# Patient Record
Sex: MALE | Race: WHITE | NOT HISPANIC OR LATINO | Employment: OTHER | URBAN - NONMETROPOLITAN AREA
[De-identification: names, ages, dates, MRNs, and addresses within clinical notes are randomized per-mention and may not be internally consistent; named-entity substitution may affect disease eponyms.]

---

## 2023-08-09 ENCOUNTER — OFFICE VISIT (OUTPATIENT)
Dept: CARDIOLOGY | Facility: CLINIC | Age: 58
End: 2023-08-09
Payer: MEDICARE

## 2023-08-09 VITALS
OXYGEN SATURATION: 99 % | WEIGHT: 203 LBS | SYSTOLIC BLOOD PRESSURE: 134 MMHG | HEIGHT: 72 IN | DIASTOLIC BLOOD PRESSURE: 72 MMHG | BODY MASS INDEX: 27.5 KG/M2 | HEART RATE: 53 BPM

## 2023-08-09 DIAGNOSIS — I10 PRIMARY HYPERTENSION: ICD-10-CM

## 2023-08-09 DIAGNOSIS — G47.33 OSA (OBSTRUCTIVE SLEEP APNEA): Primary | ICD-10-CM

## 2023-08-09 DIAGNOSIS — G47.10 HYPERSOMNIA, UNSPECIFIED: ICD-10-CM

## 2023-08-09 DIAGNOSIS — G47.00 INSOMNIA, UNSPECIFIED TYPE: ICD-10-CM

## 2023-08-09 RX ORDER — CARVEDILOL 6.25 MG/1
1 TABLET ORAL 2 TIMES DAILY
COMMUNITY

## 2023-08-09 RX ORDER — PHENOL 1.4 %
1 AEROSOL, SPRAY (ML) MUCOUS MEMBRANE NIGHTLY
COMMUNITY

## 2023-08-09 RX ORDER — ATORVASTATIN CALCIUM 40 MG/1
40 TABLET, FILM COATED ORAL DAILY
COMMUNITY
Start: 2023-04-26

## 2023-08-09 RX ORDER — NIFEDIPINE 90 MG/1
1 TABLET, FILM COATED, EXTENDED RELEASE ORAL DAILY
COMMUNITY
Start: 2023-05-22

## 2023-08-09 NOTE — PATIENT INSTRUCTIONS
Quality Sleep Information, Adult  Quality sleep is important for your mental and physical health. It also improves your quality of life. Quality sleep means you:  Are asleep for most of the time you are in bed.  Fall asleep within 30 minutes.  Wake up no more than once a night.   Are awake for no longer than 20 minutes if you do wake up during the night.  Most adults need 7-8 hours of quality sleep each night.  How can poor sleep affect me?  If you do not get enough quality sleep, you may have:  Mood swings.  Daytime sleepiness.  Confusion.  Decreased reaction time.  Sleep disorders, such as insomnia and sleep apnea.  Difficulty with:  Solving problems.  Coping with stress.  Paying attention.  These issues may affect your performance and productivity at work, school, and at home. Lack of sleep may also put you at higher risk for accidents, suicide, and risky behaviors.  If you do not get quality sleep you may also be at higher risk for several health problems, including:  Infections.  Type 2 diabetes.  Heart disease.  High blood pressure.  Obesity.  Worsening of long-term conditions, like arthritis, kidney disease, depression, Parkinson's disease, and epilepsy.  What actions can I take to get more quality sleep?  A sign showing that a person should not smoke.         A sign showing that a person should not drink alcohol.      Stick to a sleep schedule. Go to sleep and wake up at about the same time each day. Do not try to sleep less on weekdays and make up for lost sleep on weekends. This does not work.  Try to get about 30 minutes of exercise on most days. Do not exercise 2-3 hours before going to bed.  Limit naps during the day to 30 minutes or less.  Do not use any products that contain nicotine or tobacco, such as cigarettes or e-cigarettes. If you need help quitting, ask your health care provider.  Do not drink caffeinated beverages for at least 8 hours before going to bed. Coffee, tea, and some sodas contain  caffeine.  Do not drink alcohol close to bedtime.  Do not eat large meals close to bedtime.  Do not take naps in the late afternoon.  Try to get at least 30 minutes of sunlight every day. Morning sunlight is best.  Make time to relax before bed. Reading, listening to music, or taking a hot bath promotes quality sleep.  Make your bedroom a place that promotes quality sleep. Keep your bedroom dark, quiet, and at a comfortable room temperature. Make sure your bed is comfortable. Take out sleep distractions like TV, a computer, smartphone, and bright lights.  If you are lying awake in bed for longer than 20 minutes, get up and do a relaxing activity until you feel sleepy.  Work with your health care provider to treat medical conditions that may affect sleeping, such as:  Nasal obstruction.  Snoring.  Sleep apnea and other sleep disorders.  Talk to your health care provider if you think any of your prescription medicines may cause you to have difficulty falling or staying asleep.  If you have sleep problems, talk with a sleep consultant. If you think you have a sleep disorder, talk with your health care provider about getting evaluated by a specialist.  Where to find more information  National Sleep Foundation website: https://sleepfoundation.org  National Heart, Lung, and [place] (NHLBI): www.nhlbi.nih.gov/files/docs/public/sleep/healthy_sleep.pdf  Centers for Disease Control and Prevention (CDC): www.cdc.gov/sleep/index.html  Contact a health care provider if you:  Have trouble getting to sleep or staying asleep.  Often wake up very early in the morning and cannot get back to sleep.  Have daytime sleepiness.  Have daytime sleep attacks of suddenly falling asleep and sudden muscle weakness (narcolepsy).  Have a tingling sensation in your legs with a strong urge to move your legs (restless legs syndrome).  Stop breathing briefly during sleep (sleep apnea).  Think you have a sleep disorder or are taking a medicine  that is affecting your quality of sleep.  Summary  Most adults need 7-8 hours of quality sleep each night.  Getting enough quality sleep is an important part of health and well-being.  Make your bedroom a place that promotes quality sleep and avoid things that may cause you to have poor sleep, such as alcohol, caffeine, smoking, and large meals.  Talk to your health care provider if you have trouble falling asleep or staying asleep.  This information is not intended to replace advice given to you by your health care provider. Make sure you discuss any questions you have with your health care provider.  Document Revised: 10/17/2022 Document Reviewed: 10/17/2022  Elsevier Patient Education c 2022 [employer]

## 2023-08-09 NOTE — ASSESSMENT & PLAN NOTE
Known history of sleep apnea.  He reports he had his original home sleep study back around 2017.  He reports he has not been using APAP device for more than 2 years.    He has excessive daytime sleepiness, snoring at night and coexisting conditions hypertension and coronary artery disease that would benefit from sleep apnea treatment.    He has lost about 30 pounds over the last 5 years and he is exercising daily so he has made improvements that may have benefited him.    Plan a home sleep test for reevaluation of sleep apnea.  We discussed that we will call him with his Pap study results and if positive he is agreeable to restart PAP therapy.

## 2023-08-09 NOTE — PROGRESS NOTES
New Sleep Consult     Date:   2023  Name: Milton Good  :   1965  PCP: Silvio Alva MD    Chief Complaint   Patient presents with    [facility]     Sleep evaluation  Neck size - 15.5       Subjective     History of Present Illness  Milton Good is a 58 y.o. male who presents today for new patient referral from Dr. Alva with a known history of sleep apnea from 2017. He had a home study years ago and he does not recall who the doctor was. He has not used cpap therapy for several years due to not comfortable with the mask. He has seen ENT in [place] for nasal polyps and GHULAM but again, he could not find a mask that he could tolerate and fall asleep. He comes in today for new patient evaluation. He reports that he is willing to retry if this might help him.     He has been taking Melatonin but not sure it is helping. He cannot fall asleep, cannot stay asleep, moves legs in sleep and not rested in the mornings.     He has coexisting CAD, HTN.     No specialty comments available.    Further details are as follows:    Neck Measurement: 15.5 inches    [place] Scale is (out of 24): Total score: 10     Estimated average amount of sleep per night: 4  Number of times he wakes up at night: 4  Difficulty falling back asleep: yes  It usually takes 60 minutes to go to sleep.  He feels sleepy upon waking up: yes  Rotating or night shift work: no    Drowsiness/Sleepiness:  He exhibits the following:  excessive daytime sleepiness  excessive daytime fatigue  falls asleep watching TV  falls asleep during times of the day when he is quiet    Snoring/Breathing:  He exhibits the following:  loud snoring, awakens with dry mouth, sore throat when waking up in the morning, trouble breathing through nose at night, and trouble breathing through nose during the day    Head Injury:  He exhibits the following:  No    Reflux:  He describes the following:  eats 1 meals daily   exercises  "daily    Narcolepsy:  He exhibits the following:  sudden episodes of sleep during the day    RLS/PLMs:  He describes the following:  moves or jerks during sleep  Shoulder and wrist pain that interrupts sleep     Insomnia:  He describes the following:  problems initiating sleep at night  frequent awakenings  bothered by pain at night  restless sleep    Parasomnia:  He exhibits the following:  sleep talks    Weight:       08/09/23  1002   Weight: 92.1 kg (203 lb)      Weight change in the last year:  loss: 15 lbs over 5 years he has lost 35#     The patient's relevant past medical, surgical, family, and social history reviewed and updated in Epic as appropriate.    Past Medical History:   Diagnosis Date    Hyperlipidemia     Hypertension      Past Surgical History:   Procedure Laterality Date    REPLACEMENT TOTAL KNEE Right 2017       No Known Allergies  Prior to Admission medications    Medication Sig Start Date End Date Taking? Authorizing Provider   atorvastatin (LIPITOR) 40 MG tablet Take 1 tablet by mouth Daily. 4/26/23  Yes Nikole Gallardo MD   carvedilol (COREG) 6.25 MG tablet Take 1 tablet by mouth 2 (Two) Times a Day.   Yes Nikole Gallardo MD   Melatonin 10 MG tablet Take 1 tablet by mouth Every Night.   Yes Nikole Gallardo MD   NIFEdipine CC (ADALAT CC) 90 MG 24 hr tablet Take 1 tablet by mouth Daily. 5/22/23  Yes Nikole Gallardo MD   Pediatric Multivit-Minerals (COMPLETE MULTI-VITAMIN PO) Take  by mouth.   Yes Nikole Gallardo MD     Family History   Problem Relation Age of Onset    Hypertension Mother     Stroke Mother        Objective     Vital Signs:  /72 (BP Location: Right arm, Patient Position: Sitting)   Pulse 53   Ht 182.9 cm (72\")   Wt 92.1 kg (203 lb)   SpO2 99%   BMI 27.53 kg/mý     BMI is >= 25 and <30. (Overweight) The following options were offered after discussion;: referral to primary care        Physical Exam  Constitutional:       Appearance: " Normal appearance. He is well-developed.   HENT:      Head: Normocephalic and atraumatic.      Nose: Nose normal.      Mouth/Throat:      Mouth: Mucous membranes are moist.   Eyes:      General: No scleral icterus.     Pupils: Pupils are equal, round, and reactive to light.   Neck:      Vascular: No carotid bruit.   Cardiovascular:      Rate and Rhythm: Normal rate and regular rhythm.      Pulses: Normal pulses.           Radial pulses are 2+ on the right side and 2+ on the left side.        Dorsalis pedis pulses are 2+ on the right side and 2+ on the left side.        Posterior tibial pulses are 2+ on the right side and 2+ on the left side.      Heart sounds: Normal heart sounds. No murmur heard.  Pulmonary:      Effort: Pulmonary effort is normal.      Breath sounds: Normal breath sounds. No wheezing or rhonchi.   Abdominal:      General: Bowel sounds are normal.   Musculoskeletal:      Right lower leg: No edema.      Left lower leg: No edema.   Skin:     General: Skin is warm and dry.      Capillary Refill: Capillary refill takes less than 2 seconds.      Coloration: Skin is not cyanotic.      Nails: There is no clubbing.   Neurological:      Mental Status: He is alert and oriented to person, place, and time.      Motor: No weakness.      Gait: Gait normal.   Psychiatric:         Mood and Affect: Mood normal.         Behavior: Behavior normal. Behavior is cooperative.         Thought Content: Thought content normal.         Cognition and Memory: Memory normal.           Labs reviewed: 6/30/2023 CBC, CMP, TSH, iron, TIBC and office note from family physician Dr. Alva 6/30/2023.          Assessment and Plan     Milton Good is a 58 y.o. male who presents for further evaluation of excessive daytime sleepiness and fatigue, nonrestorative sleep, and concerns for sleep disordered breathing and obstructive sleep apnea.  We will obtain testing for further evaluation.  The patient will return for follow-up and  recommendations after test.  I have discussed weight loss as it pertains to obstructive sleep apnea.    Diagnoses and all orders for this visit:    1. GHULAM (obstructive sleep apnea) (Primary)  Assessment & Plan:  Known history of sleep apnea.  He reports he had his original home sleep study back around 2017.  He reports he has not been using APAP device for more than 2 years.    He has excessive daytime sleepiness, snoring at night and coexisting conditions hypertension and coronary artery disease that would benefit from sleep apnea treatment.    He has lost about 30 pounds over the last 5 years and he is exercising daily so he has made improvements that may have benefited him.    Plan a home sleep test for reevaluation of sleep apnea.  We discussed that we will call him with his Pap study results and if positive he is agreeable to restart PAP therapy.    Orders:  -     Home Sleep Study; Future    2. Hypersomnia, unspecified  Assessment & Plan:  He reports he has excessive daytime sleepiness.  He reports that he is sleepy when he is in active such as sitting quietly or watching television or as a passenger in a car.    He denies any sleepiness when he is performing actively.    We will plan a home sleep test for further evaluation.    Orders:  -     Home Sleep Study; Future    3. Primary hypertension  Assessment & Plan:  He has coexisting hypertension.  Blood pressure today is well-controlled.  He is encouraged to continue his blood pressure medications, encouraged to follow-up with his prescribing provider and he is encouraged to treat his sleep apnea as untreated sleep apnea may potentiate hypertension and his CAD.      4. Insomnia, unspecified type  Assessment & Plan:  He is having difficulty falling asleep at night takes him about an hour to fall asleep.  He is having multiple awakenings at night.  He reports that he awakens almost every hour.  He reports difficulty staying asleep and if he wakes up he has a hard  time falling back to sleep.    He has very limited caffeine use as he is only using caffeine early in the day with his morning coffee then he is drinking water and Gatorade.    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    His complaints started about 5 years ago and have gradually worsened over the last few years.    We discussed his use of melatonin.  He is taking a 10 mg dose and he is taking that just before bedtime.  We discussed optimizing this medication by taking this medication an hour and 1/2 to 2 hours before bedtime and decreasing that dose down to melatonin 5 mg.    I do not have a clear etiology of the source of his insomnia at this time but I think that him now being disabled and not being as physically tired is contributing to his inability to fall asleep and stay asleep.    We also discussed sleep hygiene and sleep behavior measures to improve his sleep.  I gave him written instructions on this today.    He may benefit from cognitive behavioral therapy and I will consider this moving forward.  We will restudy him for sleep apnea and see if sleep apnea is contributing and we will revisit this at follow-up visit.            I discussed the consequences of uncontrolled sleep apnea including hypertension, heart disease, diabetes, stroke, and dementia. I further discussed sleep apnea therapeutic options including CPAP, Weight loss, Oral dental appliance, and surgery.    Report if any new/changing symptoms immediately, Sleep risks reviewed (driving, medical, sleep death, sedating agents), and Sleep hygiene discussed         Follow Up  Return in about 3 months (around 11/9/2023) for Follow-Up after studies.  Patient was given instructions and counseling regarding his condition or for health maintenance advice. Please see specific information pulled into  the AVS if appropriate.

## 2023-08-09 NOTE — ASSESSMENT & PLAN NOTE
He reports he has excessive daytime sleepiness.  He reports that he is sleepy when he is in active such as sitting quietly or watching television or as a passenger in a car.    He denies any sleepiness when he is performing actively.    We will plan a home sleep test for further evaluation.

## 2023-08-09 NOTE — ASSESSMENT & PLAN NOTE
He is having difficulty falling asleep at night takes him about an hour to fall asleep.  He is having multiple awakenings at night.  He reports that he awakens almost every hour.  He reports difficulty staying asleep and if he wakes up he has a hard time falling back to sleep.    He has very limited caffeine use as he is only using caffeine early in the day with his morning coffee then he is drinking water and Gatorade.    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    His complaints started about 5 years ago and have gradually worsened over the last few years.    We discussed his use of melatonin.  He is taking a 10 mg dose and he is taking that just before bedtime.  We discussed optimizing this medication by taking this medication an hour and 1/2 to 2 hours before bedtime and decreasing that dose down to melatonin 5 mg.    I do not have a clear etiology of the source of his insomnia at this time but I think that him now being disabled and not being as physically tired is contributing to his inability to fall asleep and stay asleep.    We also discussed sleep hygiene and sleep behavior measures to improve his sleep.  I gave him written instructions on this today.    He may benefit from cognitive behavioral therapy and I will consider this moving forward.  We will restudy him for sleep apnea and see if sleep apnea is contributing and we will revisit this at follow-up visit.

## 2023-08-09 NOTE — ASSESSMENT & PLAN NOTE
He has coexisting hypertension.  Blood pressure today is well-controlled.  He is encouraged to continue his blood pressure medications, encouraged to follow-up with his prescribing provider and he is encouraged to treat his sleep apnea as untreated sleep apnea may potentiate hypertension and his CAD.